# Patient Record
Sex: MALE
[De-identification: names, ages, dates, MRNs, and addresses within clinical notes are randomized per-mention and may not be internally consistent; named-entity substitution may affect disease eponyms.]

---

## 2023-02-02 ENCOUNTER — APPOINTMENT (OUTPATIENT)
Dept: ORTHOPEDIC SURGERY | Facility: CLINIC | Age: 63
End: 2023-02-02
Payer: COMMERCIAL

## 2023-02-02 PROBLEM — Z00.00 ENCOUNTER FOR PREVENTIVE HEALTH EXAMINATION: Status: ACTIVE | Noted: 2023-02-02

## 2023-02-02 PROCEDURE — 99203 OFFICE O/P NEW LOW 30 MIN: CPT

## 2023-02-02 NOTE — IMAGING
[de-identified] : CSPINE\par Inspection: No rash or ecchymosis\par Palpation: No spasm in traps, rhomboids, paracervicals\par ROM: limited all planes\par Strength: 5/5 bilateral deltoid, biceps, triceps, wrist flexors, wrist extensors, , abductors\par Sensation: Sensation present to light touch bilateral C5-T1 distributions\par Reflexes: Negative Newman's bilaterally, DTRs 1+ b/l biceps, diminished b/l BR\par Able to tandem gait

## 2023-02-02 NOTE — HISTORY OF PRESENT ILLNESS
[Neck] : neck [10] : 10 [Dull/Aching] : dull/aching [Intermittent] : intermittent [Sleep] : sleep [Nothing helps with pain getting better] : Nothing helps with pain getting better [de-identified] : C MRI report from 1/22/2018 reviewed- \par Moderate to severe central stenosis C4-C6, moderate central stenosis C6/7. B/L NF stenosis as well. 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 Has pain and N/T down the LUE. Has had CSIs previously, most recently 5 years ago. Reports history of stenosis. No bb dysfunction. No dexterity issues. No balance issues. \par Has been through formal PT. Takes roddy, has stopped working as well. \par Past surgical history: lumbar fusion 2022 (Dr. Vilchis, [place])  [] : no [FreeTextEntry5] : \par  [de-identified] : certain moment  [de-identified] : a few weeks ago  [de-identified] : ENT provider  [FreeTextEntry1] : F/u 1 month\par call in 1 week with progress [de-identified] : [place] (1 year ago)

## 2023-02-09 ENCOUNTER — FORM ENCOUNTER (OUTPATIENT)
Age: 63
End: 2023-02-09

## 2023-02-10 ENCOUNTER — APPOINTMENT (OUTPATIENT)
Dept: MRI IMAGING | Facility: CLINIC | Age: 63
End: 2023-02-10
Payer: COMMERCIAL

## 2023-02-10 PROCEDURE — 72141 MRI NECK SPINE W/O DYE: CPT

## 2023-03-30 ENCOUNTER — APPOINTMENT (OUTPATIENT)
Dept: ORTHOPEDIC SURGERY | Facility: CLINIC | Age: 63
End: 2023-03-30
Payer: COMMERCIAL

## 2023-03-30 PROCEDURE — 99215 OFFICE O/P EST HI 40 MIN: CPT

## 2023-03-30 NOTE — ASSESSMENT
[FreeTextEntry1] : C4/5 bulge w/ abutment of cord and L NF stenosis;\par C5/6, C6/7 bulges with NF stenosis\par Discussed surgery

## 2023-03-30 NOTE — IMAGING
[de-identified] : CSPINE\par Inspection: No rash or ecchymosis\par Palpation: No spasm in traps, rhomboids, paracervicals\par ROM: limited all planes\par Strength: 5/5 bilateral deltoid, biceps, triceps, wrist flexors, wrist extensors, , abductors\par Sensation: Sensation present to light touch bilateral C5-T1 distributions\par Reflexes: Negative Newman's bilaterally, DTRs 1+ b/l biceps, diminished b/l BR\par Able to tandem gait

## 2023-03-30 NOTE — HISTORY OF PRESENT ILLNESS
[Neck] : neck [10] : 10 [Dull/Aching] : dull/aching [Intermittent] : intermittent [Sleep] : sleep [Nothing helps with pain getting better] : Nothing helps with pain getting better [de-identified] : C MRI report from 1/22/2018 reviewed- \par Moderate to severe central stenosis C4-C6, moderate central stenosis C6/7. B/L NF stenosis as well. \par \par 2/10/23 Cervical MRI  - report noted in chart. \par Findings: Straightening of lordosis. Multilevel loss of disc signal and height. Multilevel anterior spurring and \par bulging. No compression fracture.\par C2-C3: No herniation, foraminal stenosis or central stenosis.\par C3-C4: Bulge with central herniation and no central stenosis. No foraminal stenosis.\par C4-C5: Grade 1 anterior spondylolisthesis. Bulge with central herniation and no central stenosis. Luschka \par hypertrophy and facet hypertrophy with left foraminal stenosis.\par C5-C6: Bulge with broad herniation and no central stenosis. Luschka hypertrophy and facet hypertrophy with \par foraminal stenosis.\par C6-C7: Bulge with broad herniation and no central stenosis. Luschka hypertrophy and facet hypertrophy with \par left foraminal stenosis.\par C7-T1: No herniation, foraminal stenosis or central stenosis.\par Cord, no atrophy or enlargement. Craniocervical junction is normally positioned. Facets are aligned and\par ligaments are intact. Soft tissues are intact. No muscle tear. No ligament tear. No fluid collection.\par Ind. review- \par C4/5 bulge w/ abutment of cord and L NF stenosis;\par C5/6, C6/7 bulges with NF stenosis\par -----------------------------------------------------\par 2/2/23- 64 y/o RDH M presents for neck pain X 4 years. Has pain and N/T down the LUE. Has had CSIs previously, most recently 5 years ago. Reports history of stenosis. No bb dysfunction. No dexterity issues. No balance issues. \par Has been through formal PT. Takes [employer], has stopped working as well. \par Past surgical history: lumbar fusion 2022 (Dr. Vilchis, [place]) \par 3/30/23- MRI f/u. Still pain, N/T radiating down the LUE to the digits [] : no [FreeTextEntry5] : \par  [de-identified] : certain moment  [de-identified] : a few weeks ago  [de-identified] : ENT provider  [de-identified] : [place] (1 year ago)

## 2023-05-25 ENCOUNTER — APPOINTMENT (OUTPATIENT)
Dept: ORTHOPEDIC SURGERY | Facility: CLINIC | Age: 63
End: 2023-05-25
Payer: COMMERCIAL

## 2023-05-25 PROCEDURE — 99214 OFFICE O/P EST MOD 30 MIN: CPT

## 2023-05-25 RX ORDER — CYCLOBENZAPRINE HYDROCHLORIDE 5 MG/1
5 TABLET, FILM COATED ORAL 3 TIMES DAILY
Qty: 90 | Refills: 0 | Status: ACTIVE | COMMUNITY
Start: 2023-05-25 | End: 1900-01-01

## 2023-05-25 NOTE — IMAGING
[de-identified] : CSPINE\par Inspection: No rash or ecchymosis\par Palpation: left trapezial tenderness/spasm\par ROM: limited all planes\par Strength: 5/5 bilateral deltoid, biceps, triceps, wrist flexors, wrist extensors, , abductors\par Sensation: Sensation present to light touch bilateral C5-T1 distributions\par Reflexes: Negative Newman's bilaterally, DTRs 1+ b/l biceps, diminished b/l BR\par Able to tandem gait

## 2023-05-25 NOTE — HISTORY OF PRESENT ILLNESS
[Neck] : neck [10] : 10 [Dull/Aching] : dull/aching [Intermittent] : intermittent [Sleep] : sleep [Nothing helps with pain getting better] : Nothing helps with pain getting better [de-identified] : C MRI report from 1/22/2018 reviewed- \par Moderate to severe central stenosis C4-C6, moderate central stenosis C6/7. B/L NF stenosis as well. \par \par 2/10/23 Cervical MRI  - report noted in chart. \par Findings: Straightening of lordosis. Multilevel loss of disc signal and height. Multilevel anterior spurring and \par bulging. No compression fracture.\par C2-C3: No herniation, foraminal stenosis or central stenosis.\par C3-C4: Bulge with central herniation and no central stenosis. No foraminal stenosis.\par C4-C5: Grade 1 anterior spondylolisthesis. Bulge with central herniation and no central stenosis. Luschka \par hypertrophy and facet hypertrophy with left foraminal stenosis.\par C5-C6: Bulge with broad herniation and no central stenosis. Luschka hypertrophy and facet hypertrophy with \par foraminal stenosis.\par C6-C7: Bulge with broad herniation and no central stenosis. Luschka hypertrophy and facet hypertrophy with \par left foraminal stenosis.\par C7-T1: No herniation, foraminal stenosis or central stenosis.\par Cord, no atrophy or enlargement. Craniocervical junction is normally positioned. Facets are aligned and\par ligaments are intact. Soft tissues are intact. No muscle tear. No ligament tear. No fluid collection.\par Ind. 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 Has pain and N/T down the LUE. Has had CSIs previously, most recently 5 years ago. Reports history of stenosis. No bb dysfunction. No dexterity issues. No balance issues. \par Has been through formal PT. Takes [employer], has stopped working as well. \par Past surgical history: lumbar fusion 2022 (Dr. Vilchis, [place]) \par 3/30/23- MRI f/u. Still pain, N/T radiating down the LUE to the digits\par 5/25/23- Pain worse since last visit. Has been doing HEP. Continued radiation down LUE to radial 2 digits. Cramping in R index finger as well.  Denies b/b incontinence. [] : no [FreeTextEntry5] : TAYLOR 63 year old M here for C-spine, Pt states pain has worsen since last visit. Pt is currently taking Tylenol and Advil with little to no relief. Reports he does at home exercises.  [de-identified] : certain moment  [de-identified] : a few weeks ago  [de-identified] : ENT provider  [de-identified] : [place] (1 year ago)

## 2023-05-25 NOTE — ASSESSMENT
[FreeTextEntry1] : C4/5 bulge w/ abutment of cord and L NF stenosis;\par C5/6, C6/7 bulges with NF stenosis\par discussed CSM and signs/symptoms to be watchful for\par Patient not interested in injections/surgery at this time. \par c/w HEP\par f/u 3 months\par \par Muscle Relaxants- To help decrease muscle spasm and assist with pain relief. Advised of sedating effects and instructed not to drive, operate heavy machinery, or take with other sedating medications.\par \par \par Patient seen by Alley Bautista PA-C,  under the supervision of  Dr. Rob Arellano M.D.\par

## 2023-08-24 ENCOUNTER — APPOINTMENT (OUTPATIENT)
Dept: ORTHOPEDIC SURGERY | Facility: CLINIC | Age: 63
End: 2023-08-24
Payer: COMMERCIAL

## 2023-08-24 PROCEDURE — 99213 OFFICE O/P EST LOW 20 MIN: CPT

## 2023-08-24 NOTE — ASSESSMENT
[FreeTextEntry1] : C4/5 bulge w/ abutment of cord and L NF stenosis; C5/6, C6/7 bulges with NF stenosis discussed CSM and signs/symptoms to be watchful for Patient not interested in injections/surgery at this time.  c/w HEP f/u 3 months  Muscle Relaxants- To help decrease muscle spasm and assist with pain relief. Advised of sedating effects and instructed not to drive, operate heavy machinery, or take with other sedating medications.   Patient seen by Alley Bautista PA-C,  under the supervision of  Dr. Rob Arellano M.D.

## 2023-08-24 NOTE — IMAGING
[de-identified] : CSPINE\par  Inspection: No rash or ecchymosis\par  Palpation: left trapezial tenderness/spasm\par  ROM: limited all planes\par  Strength: 5/5 bilateral deltoid, biceps, triceps, wrist flexors, wrist extensors, , abductors\par  Sensation: Sensation present to light touch bilateral C5-T1 distributions\par  Reflexes: Negative Newman's bilaterally, DTRs 1+ b/l biceps, diminished b/l BR\par  Able to tandem gait

## 2023-08-24 NOTE — HISTORY OF PRESENT ILLNESS
[Neck] : neck [10] : 10 [Dull/Aching] : dull/aching [Intermittent] : intermittent [Sleep] : sleep [Nothing helps with pain getting better] : Nothing helps with pain getting better [de-identified] : C MRI report from 1/22/2018 reviewed-  Moderate to severe central stenosis C4-C6, moderate central stenosis C6/7. B/L NF stenosis as well.   2/10/23 Cervical MRI  - report noted in chart.  Findings: Straightening of lordosis. Multilevel loss of disc signal and height. Multilevel anterior spurring and  bulging. No compression fracture. C2-C3: No herniation, foraminal stenosis or central stenosis. C3-C4: Bulge with central herniation and no central stenosis. No foraminal stenosis. C4-C5: Grade 1 anterior spondylolisthesis. Bulge with central herniation and no central stenosis. Luschka  hypertrophy and facet hypertrophy with left foraminal stenosis. C5-C6: Bulge with broad herniation and no central stenosis. Luschka hypertrophy and facet hypertrophy with  foraminal stenosis. C6-C7: Bulge with broad herniation and no central stenosis. Luschka hypertrophy and facet hypertrophy with  left foraminal stenosis. C7-T1: No herniation, foraminal stenosis or central stenosis. Cord, no atrophy or enlargement. Craniocervical junction is normally positioned. Facets are aligned and ligaments are intact. Soft tissues are intact. No muscle tear. No ligament tear. No fluid collection. Ind. 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 Has pain and N/T down the LUE. Has had CSIs previously, most recently 5 years ago. Reports history of stenosis. No bb dysfunction. No dexterity issues. No balance issues.  Has been through formal PT. Takes [employer], has stopped working as well.  Past surgical history: lumbar fusion 2022 (Dr. Vilchis, [place])  3/30/23- MRI f/u. Still pain, N/T radiating down the LUE to the digits 5/25/23- Pain worse since last visit. Has been doing HEP. Continued radiation down LUE to radial 2 digits. Cramping in R index finger as well.  Denies b/b incontinence. 8/24/23- neck pain the same. Continued radiation down LUE to radial 2 digits. Reports worsening balance/sensation of dizziness. Denies b/b dysfunction. Denies dexterity issues. Doing HEP.  [] : no [FreeTextEntry5] : TAYLOR 63 year old M here for C-spine follow up, pt states he hasn't improved since the last visit. He feels like his neck isn't strong enough to keep his head up and it hurts when he turns his head. Pt is currently taking Tylenol and Advil with little to no relief. Reports he does at home exercises.  [de-identified] : certain moment  [de-identified] : a few weeks ago  [de-identified] : ENT provider  [de-identified] : [place] (1 year ago)

## 2023-11-30 ENCOUNTER — APPOINTMENT (OUTPATIENT)
Dept: ORTHOPEDIC SURGERY | Facility: CLINIC | Age: 63
End: 2023-11-30
Payer: COMMERCIAL

## 2023-11-30 PROCEDURE — 99213 OFFICE O/P EST LOW 20 MIN: CPT

## 2023-12-05 ENCOUNTER — APPOINTMENT (OUTPATIENT)
Dept: INTERNAL MEDICINE | Facility: CLINIC | Age: 63
End: 2023-12-05

## 2024-02-08 ENCOUNTER — APPOINTMENT (OUTPATIENT)
Dept: ORTHOPEDIC SURGERY | Facility: CLINIC | Age: 64
End: 2024-02-08
Payer: COMMERCIAL

## 2024-02-08 PROCEDURE — 99213 OFFICE O/P EST LOW 20 MIN: CPT

## 2024-02-08 NOTE — ASSESSMENT
[FreeTextEntry1] : C4/5 bulge w/ abutment of cord and L NF stenosis; C5/6, C6/7 bulges with NF stenosis discussed CSM and signs/symptoms to be watchful for Patient not interested in injections/surgery at this time massage therapy referral f/u 3 months  Muscle Relaxants- To help decrease muscle spasm and assist with pain relief. Advised of sedating effects and instructed not to drive, operate heavy machinery, or take with other sedating medications.   Patient seen by Alley Bautista PA-C, under the supervision of Dr. Rob Arellano M.D.

## 2024-02-08 NOTE — IMAGING
[de-identified] : CSPINE Inspection: No rash or ecchymosis Palpation: left trapezial tenderness/spasm ROM: limited all planes Strength: 5/5 bilateral deltoid, biceps, triceps, wrist flexors, wrist extensors, , abductors Sensation: Sensation present to light touch bilateral C5-T1 distributions Reflexes: Negative Newman's bilaterally, DTRs 1+ b/l biceps, diminished b/l BR Able to tandem gait

## 2024-02-08 NOTE — HISTORY OF PRESENT ILLNESS
[Neck] : neck [10] : 10 [Dull/Aching] : dull/aching [Intermittent] : intermittent [Sleep] : sleep [Nothing helps with pain getting better] : Nothing helps with pain getting better [de-identified] : C MRI report from 1/22/2018 reviewed-  Moderate to severe central stenosis C4-C6, moderate central stenosis C6/7. B/L NF stenosis as well.   2/10/23 Cervical MRI  - report noted in chart.  Findings: Straightening of lordosis. Multilevel loss of disc signal and height. Multilevel anterior spurring and  bulging. No compression fracture. C2-C3: No herniation, foraminal stenosis or central stenosis. C3-C4: Bulge with central herniation and no central stenosis. No foraminal stenosis. C4-C5: Grade 1 anterior spondylolisthesis. Bulge with central herniation and no central stenosis. Luschka  hypertrophy and facet hypertrophy with left foraminal stenosis. C5-C6: Bulge with broad herniation and no central stenosis. Luschka hypertrophy and facet hypertrophy with  foraminal stenosis. C6-C7: Bulge with broad herniation and no central stenosis. Luschka hypertrophy and facet hypertrophy with  left foraminal stenosis. C7-T1: No herniation, foraminal stenosis or central stenosis. Cord, no atrophy or enlargement. Craniocervical junction is normally positioned. Facets are aligned and ligaments are intact. Soft tissues are intact. No muscle tear. No ligament tear. No fluid collection. Ind. 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 Has pain and N/T down the LUE. Has had CSIs previously, most recently 5 years ago. Reports history of stenosis. No bb dysfunction. No dexterity issues. No balance issues.  Has been through formal PT. Takes [employer], has stopped working as well.  Past surgical history: lumbar fusion 2022 (Dr. Vilchis, [place])  3/30/23- MRI f/u. Still pain, N/T radiating down the LUE to the digits 5/25/23- Pain worse since last visit. Has been doing HEP. Continued radiation down LUE to radial 2 digits. Cramping in R index finger as well.  Denies b/b incontinence. 8/24/23- neck pain the same. Continued radiation down LUE to radial 2 digits. Reports worsening balance/sensation of dizziness. Denies b/b dysfunction. Denies dexterity issues. Doing HEP.  11/30/23- Neck pain/stiffness worse since last visit. Numbness in LUE to ulnar 3 digits when waking up in AM. Was doing HEP, but discontinued due to aggravation of pain.  2/8/2024: Patient is here to follow up on cervical spine. Patient reports no improvement in neck since last visit and wasn't able to find massage therapy. Continued numbness in LUE to ulnar 3 digits. Associated headaches.  [] : no [FreeTextEntry5] : 02/08/2024: Pt is here today to follow up on neck pain  [de-identified] : certain moment  [de-identified] : a few weeks ago  [de-identified] : ENT provider  [de-identified] : [place] (1 year ago)

## 2024-05-09 ENCOUNTER — APPOINTMENT (OUTPATIENT)
Dept: ORTHOPEDIC SURGERY | Facility: CLINIC | Age: 64
End: 2024-05-09
Payer: COMMERCIAL

## 2024-05-09 DIAGNOSIS — M54.12 RADICULOPATHY, CERVICAL REGION: ICD-10-CM

## 2024-05-09 DIAGNOSIS — M75.02 ADHESIVE CAPSULITIS OF LEFT SHOULDER: ICD-10-CM

## 2024-05-09 DIAGNOSIS — M54.2 CERVICALGIA: ICD-10-CM

## 2024-05-09 DIAGNOSIS — M48.02 SPINAL STENOSIS, CERVICAL REGION: ICD-10-CM

## 2024-05-09 PROCEDURE — 99214 OFFICE O/P EST MOD 30 MIN: CPT

## 2024-05-09 NOTE — HISTORY OF PRESENT ILLNESS
[Neck] : neck [10] : 10 [Dull/Aching] : dull/aching [Intermittent] : intermittent [Sleep] : sleep [Nothing helps with pain getting better] : Nothing helps with pain getting better [de-identified] : C MRI report from 1/22/2018 reviewed-  Moderate to severe central stenosis C4-C6, moderate central stenosis C6/7. B/L NF stenosis as well.   2/10/23 Cervical MRI  - report noted in chart.  Findings: Straightening of lordosis. Multilevel loss of disc signal and height. Multilevel anterior spurring and  bulging. No compression fracture. C2-C3: No herniation, foraminal stenosis or central stenosis. C3-C4: Bulge with central herniation and no central stenosis. No foraminal stenosis. C4-C5: Grade 1 anterior spondylolisthesis. Bulge with central herniation and no central stenosis. Luschka  hypertrophy and facet hypertrophy with left foraminal stenosis. C5-C6: Bulge with broad herniation and no central stenosis. Luschka hypertrophy and facet hypertrophy with  foraminal stenosis. C6-C7: Bulge with broad herniation and no central stenosis. Luschka hypertrophy and facet hypertrophy with  left foraminal stenosis. C7-T1: No herniation, foraminal stenosis or central stenosis. Cord, no atrophy or enlargement. Craniocervical junction is normally positioned. Facets are aligned and ligaments are intact. Soft tissues are intact. No muscle tear. No ligament tear. No fluid collection. Ind. review-  C4/5 bulge w/ abutment of cord and L NF stenosis; C5/6, C6/7 bulges with NF stenosis ----------------------------------------------------- 2/2/23- 64 y/o RDH M presents for neck pain X 4 years. Has pain and N/T down the LUE. Has had CSIs previously, most recently 5 years ago. Reports history of stenosis. No bb dysfunction. No dexterity issues. No balance issues.  Has been through formal PT. Takes [employer], has stopped working as well.  Past surgical history: lumbar fusion 2022 (Dr. Vilchis, [place])  3/30/23- MRI f/u. Still pain, N/T radiating down the LUE to the digits 5/25/23- Pain worse since last visit. Has been doing HEP. Continued radiation down LUE to radial 2 digits. Cramping in R index finger as well.  Denies b/b incontinence. 8/24/23- neck pain the same. Continued radiation down LUE to radial 2 digits. Reports worsening balance/sensation of dizziness. Denies b/b dysfunction. Denies dexterity issues. Doing HEP.  11/30/23- Neck pain/stiffness worse since last visit. Numbness in LUE to ulnar 3 digits when waking up in AM. Was doing HEP, but discontinued due to aggravation of pain.  2/8/2024: Patient is here to follow up on cervical spine. Patient reports no improvement in neck since last visit and wasn't able to find massage therapy. Continued numbness in LUE to ulnar 3 digits. Associated headaches.  05/09/2024 TAYLOR 64 year M is here today to follow up on cervical spine pain. Patient states that there has been no improvement since last visit. pt states that he is now doing physical therapy and message therapy. which does not help with the pain. Still neck pain through the L scapula toward the LUE.   [] : no [FreeTextEntry5] : 02/08/2024: Pt is here today to follow up on neck pain  [de-identified] : certain moment  [de-identified] : a few weeks ago  [de-identified] : ENT provider  [de-identified] : [facility] (1 year ago)

## 2024-05-09 NOTE — IMAGING
[de-identified] : CSPINE Inspection: No rash or ecchymosis Palpation: left trapezial tenderness/spasm ROM: limited all planes Strength: 5/5 bilateral deltoid, biceps, triceps, wrist flexors, wrist extensors, , abductors Sensation: Sensation present to light touch bilateral C5-T1 distributions Reflexes: Negative Newman's bilaterally, DTRs 1+ b/l biceps, diminished b/l BR Able to tandem gait  Left shoulder ER in adduction 40 degrees with pain

## 2024-05-09 NOTE — ASSESSMENT
[FreeTextEntry1] : C4/5 bulge w/ abutment of cord and L NF stenosis; C5/6, C6/7 bulges with NF stenosis discussed CSM and signs/symptoms to be watchful for Patient not interested in injections/surgery at this time massage therapy referral f/u 3 months  PT  Muscle Relaxants- To help decrease muscle spasm and assist with pain relief. Advised of sedating effects and instructed not to drive, operate heavy machinery, or take with other sedating medications.

## 2024-09-12 ENCOUNTER — APPOINTMENT (OUTPATIENT)
Dept: ORTHOPEDIC SURGERY | Facility: CLINIC | Age: 64
End: 2024-09-12
Payer: COMMERCIAL

## 2024-09-12 DIAGNOSIS — M54.12 RADICULOPATHY, CERVICAL REGION: ICD-10-CM

## 2024-09-12 DIAGNOSIS — M54.2 CERVICALGIA: ICD-10-CM

## 2024-09-12 DIAGNOSIS — M48.02 SPINAL STENOSIS, CERVICAL REGION: ICD-10-CM

## 2024-09-12 PROCEDURE — 99213 OFFICE O/P EST LOW 20 MIN: CPT

## 2024-09-12 RX ORDER — MELOXICAM 15 MG/1
15 TABLET ORAL DAILY
Qty: 20 | Refills: 0 | Status: ACTIVE | COMMUNITY
Start: 2024-09-12 | End: 1900-01-01

## 2024-09-12 NOTE — HISTORY OF PRESENT ILLNESS
[Neck] : neck [10] : 10 [Dull/Aching] : dull/aching [Intermittent] : intermittent [Sleep] : sleep [Nothing helps with pain getting better] : Nothing helps with pain getting better [de-identified] : C MRI report from 1/22/2018 reviewed-  Moderate to severe central stenosis C4-C6, moderate central stenosis C6/7. B/L NF stenosis as well.   2/10/23 Cervical MRI  - report noted in chart.  Findings: Straightening of lordosis. Multilevel loss of disc signal and height. Multilevel anterior spurring and  bulging. No compression fracture. C2-C3: No herniation, foraminal stenosis or central stenosis. C3-C4: Bulge with central herniation and no central stenosis. No foraminal stenosis. C4-C5: Grade 1 anterior spondylolisthesis. Bulge with central herniation and no central stenosis. Luschka  hypertrophy and facet hypertrophy with left foraminal stenosis. C5-C6: Bulge with broad herniation and no central stenosis. Luschka hypertrophy and facet hypertrophy with  foraminal stenosis. C6-C7: Bulge with broad herniation and no central stenosis. Luschka hypertrophy and facet hypertrophy with  left foraminal stenosis. C7-T1: No herniation, foraminal stenosis or central stenosis. Cord, no atrophy or enlargement. Craniocervical junction is normally positioned. Facets are aligned and ligaments are intact. Soft tissues are intact. No muscle tear. No ligament tear. No fluid collection. Ind. review-  C4/5 bulge w/ abutment of cord and L NF stenosis; C5/6, C6/7 bulges with NF stenosis ----------------------------------------------------- 2/2/23- 64 y/o RDH M presents for neck pain X 4 years. Has pain and N/T down the LUE. Has had CSIs previously, most recently 5 years ago. Reports history of stenosis. No bb dysfunction. No dexterity issues. No balance issues.  Has been through formal PT. Takes [employer], has stopped working as well.  Past surgical history: lumbar fusion 2022 (Dr. Vilchis, [place])  3/30/23- MRI f/u. Still pain, N/T radiating down the LUE to the digits 5/25/23- Pain worse since last visit. Has been doing HEP. Continued radiation down LUE to radial 2 digits. Cramping in R index finger as well.  Denies b/b incontinence. 8/24/23- neck pain the same. Continued radiation down LUE to radial 2 digits. Reports worsening balance/sensation of dizziness. Denies b/b dysfunction. Denies dexterity issues. Doing HEP.  11/30/23- Neck pain/stiffness worse since last visit. Numbness in LUE to ulnar 3 digits when waking up in AM. Was doing HEP, but discontinued due to aggravation of pain.  2/8/2024: Patient is here to follow up on cervical spine. Patient reports no improvement in neck since last visit and wasn't able to find massage therapy. Continued numbness in LUE to ulnar 3 digits. Associated headaches.  05/09/2024 TAYLOR 64 year M is here today to follow up on cervical spine pain. Patient states that there has been no improvement since last visit. pt states that he is now doing physical therapy and message therapy. which does not help with the pain. Still neck pain through the L scapula toward the LUE.    09/12/2024: Patient is here to follow up cervical spine and left scapula. Patient finished PT, does not take meds. Patient reports symptoms have worsened since last visit, reports intense pain traveling down LUE and across the left side of his face. [] : no [FreeTextEntry5] : 02/08/2024: Pt is here today to follow up on neck pain  [de-identified] : certain moment  [de-identified] : a few weeks ago  [de-identified] : ENT provider  [de-identified] : [facility] (1 year ago)

## 2024-09-12 NOTE — IMAGING
[de-identified] : CSPINE Inspection: No rash or ecchymosis Palpation: left trapezial tenderness/spasm ROM: limited all planes Strength: 5/5 bilateral deltoid, biceps, triceps, wrist flexors, wrist extensors, , abductors Sensation: Sensation present to light touch bilateral C5-T1 distributions Reflexes: Negative Newman's bilaterally, DTRs 1+ b/l biceps, diminished b/l BR Able to tandem gait  Left shoulder ER in adduction 40 degrees with pain

## 2024-09-12 NOTE — ASSESSMENT
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  Advised to clear medication with internist or PCP if any concurrent health problem with heart, blood pressure, or GI system exists.  Patient seen by KURTIS Rodriguez under the direct supervision of Rob Day

## 2024-09-30 ENCOUNTER — APPOINTMENT (OUTPATIENT)
Dept: PAIN MANAGEMENT | Facility: CLINIC | Age: 64
End: 2024-09-30
Payer: COMMERCIAL

## 2024-09-30 VITALS — BODY MASS INDEX: 28.88 KG/M2 | HEIGHT: 67 IN | WEIGHT: 184 LBS

## 2024-09-30 DIAGNOSIS — M54.12 RADICULOPATHY, CERVICAL REGION: ICD-10-CM

## 2024-09-30 DIAGNOSIS — Z86.69 PERSONAL HISTORY OF OTHER DISEASES OF THE NERVOUS SYSTEM AND SENSE ORGANS: ICD-10-CM

## 2024-09-30 DIAGNOSIS — Z82.49 FAMILY HISTORY OF ISCHEMIC HEART DISEASE AND OTHER DISEASES OF THE CIRCULATORY SYSTEM: ICD-10-CM

## 2024-09-30 DIAGNOSIS — M48.02 SPINAL STENOSIS, CERVICAL REGION: ICD-10-CM

## 2024-09-30 DIAGNOSIS — Z86.79 PERSONAL HISTORY OF OTHER DISEASES OF THE CIRCULATORY SYSTEM: ICD-10-CM

## 2024-09-30 DIAGNOSIS — Z86.39 PERSONAL HISTORY OF OTHER ENDOCRINE, NUTRITIONAL AND METABOLIC DISEASE: ICD-10-CM

## 2024-09-30 DIAGNOSIS — M54.2 CERVICALGIA: ICD-10-CM

## 2024-09-30 DIAGNOSIS — M47.812 SPONDYLOSIS W/OUT MYELOPATHY OR RADICULOPATHY, CERVICAL REGION: ICD-10-CM

## 2024-09-30 PROCEDURE — 99204 OFFICE O/P NEW MOD 45 MIN: CPT

## 2024-09-30 NOTE — PHYSICAL EXAM
[de-identified] : Gen: NAD Head: NC/AT Neck: +spurling's on the LEFT, +TTP in the left mid cervical facet region Eyes: no glasses, no scleral icterus ENT: mucous membranes moist CV: no JVD Lungs: nonlabored breathing Abd: soft, NT/ND Ext: full ROM in all extremities, no peripheral edema Neuro: CN intact UEs +5 L +5 R shoulder abduction +5 L +5 R arm abduction +5 L +5 R forearm flexion +5 L +5 R forearm extension +5 L +5 R finger flexion +5 L +5 R  strength Psych: normal affect Skin: no visible lesions

## 2024-09-30 NOTE — DATA REVIEWED
[MRI] : MRI [Cervical Spine] : cervical spine [Report was reviewed and noted in the chart] : The report was reviewed and noted in the chart [I independently reviewed and interpreted images and report] : I independently reviewed and interpreted images and report [FreeTextEntry1] : 2/10/2023 MRI Cervical Spine O&C C4-C5: grade I anterolisthesis, disc bulge w/ central herniation; luschka hypertrophy, facet hypertrophy, LEFT NF stenosis C5-C6: disc bulge w/ broad herniation w/ no central stenosis, luschka hypertrophy, facet hypertrophy w/ NF stenosis C6-C7: disc bulge w/ broad herniation w/ no central stenosis, luschka hypertrophy, facet hypertrophy w/ LEFT NF stenosis

## 2024-09-30 NOTE — DISCUSSION/SUMMARY
[de-identified] : TAYLOR GASPAR is a 64 year-old man presenting for a NPV for a history of chronic neck pain.  Prior treatment: NURY w/ significant improvement of pain Physical therapy TENS unit Acupuncture Heat, rest, ice Patient has participated and failed at least 6 weeks of conservative therapy including physical therapy and a prescribed home exercise program as well as 6 weeks of activity modifications, including heat, ice, rest, and over the counter medications.  Plan: 1) MRI cervical spine images reviewed with the patient. 2) Discussed a C6-C7 NURY, patient declined 3) Schedule LEFT C3, C4, C5, C6 MBB w/o MAC. The procedure was explained to the patient in detail. Reviewed risks, benefits, and alternatives with the patient. Some risks discussed included temporary increase in pain, bleeding, infection, and side effects from steroids. The patient expressed understanding and would like to proceed. 4) Continue home exercise regimen 5) RTC post procedure

## 2024-09-30 NOTE — PHYSICAL EXAM
[de-identified] : Gen: NAD Head: NC/AT Neck: +spurling's on the LEFT, +TTP in the left mid cervical facet region Eyes: no glasses, no scleral icterus ENT: mucous membranes moist CV: no JVD Lungs: nonlabored breathing Abd: soft, NT/ND Ext: full ROM in all extremities, no peripheral edema Neuro: CN intact UEs +5 L +5 R shoulder abduction +5 L +5 R arm abduction +5 L +5 R forearm flexion +5 L +5 R forearm extension +5 L +5 R finger flexion +5 L +5 R  strength Psych: normal affect Skin: no visible lesions

## 2024-09-30 NOTE — DISCUSSION/SUMMARY
[de-identified] : TAYLOR GASPAR is a 64 year-old man presenting for a NPV for a history of chronic neck pain.  Prior treatment: NURY w/ significant improvement of pain Physical therapy TENS unit Acupuncture Heat, rest, ice Patient has participated and failed at least 6 weeks of conservative therapy including physical therapy and a prescribed home exercise program as well as 6 weeks of activity modifications, including heat, ice, rest, and over the counter medications.  Plan: 1) MRI cervical spine images reviewed with the patient. 2) Discussed a C6-C7 NURY, patient declined 3) Schedule LEFT C3, C4, C5, C6 MBB w/o MAC. The procedure was explained to the patient in detail. Reviewed risks, benefits, and alternatives with the patient. Some risks discussed included temporary increase in pain, bleeding, infection, and side effects from steroids. The patient expressed understanding and would like to proceed. 4) Continue home exercise regimen 5) RTC post procedure

## 2024-09-30 NOTE — HISTORY OF PRESENT ILLNESS
[Neck] : neck [Left Arm] : left arm [8] : 8 [10] : 10 [Dull/Aching] : dull/aching [Radiating] : radiating [Tightness] : tightness [Tingling] : tingling [Intermittent] : intermittent [Household chores] : household chores [Leisure] : leisure [Sleep] : sleep [Physical therapy] : physical therapy [Lying in bed] : lying in bed [] : no [FreeTextEntry1] : L shoulder  [FreeTextEntry6] : numbness and tingling in L arm  [FreeTextEntry7] : L arm  [de-identified] : Meloxicam and cyclobenzaprine did not help with pain given by Dr. Arellano

## 2024-09-30 NOTE — HISTORY OF PRESENT ILLNESS
[Neck] : neck [Left Arm] : left arm [8] : 8 [10] : 10 [Dull/Aching] : dull/aching [Radiating] : radiating [Tightness] : tightness [Tingling] : tingling [Intermittent] : intermittent [Household chores] : household chores [Leisure] : leisure [Sleep] : sleep [Physical therapy] : physical therapy [Lying in bed] : lying in bed [] : no [FreeTextEntry1] : L shoulder  [FreeTextEntry6] : numbness and tingling in L arm  [FreeTextEntry7] : L arm  [de-identified] : Meloxicam and cyclobenzaprine did not help with pain given by Dr. Arellano

## 2024-10-22 ENCOUNTER — APPOINTMENT (OUTPATIENT)
Dept: PAIN MANAGEMENT | Facility: CLINIC | Age: 64
End: 2024-10-22
Payer: COMMERCIAL

## 2024-10-22 DIAGNOSIS — M47.812 SPONDYLOSIS W/OUT MYELOPATHY OR RADICULOPATHY, CERVICAL REGION: ICD-10-CM

## 2024-10-22 PROCEDURE — 64492 INJ PARAVERT F JNT C/T 3 LEV: CPT | Mod: LT

## 2024-10-22 PROCEDURE — 64491 INJ PARAVERT F JNT C/T 2 LEV: CPT | Mod: LT

## 2024-10-22 PROCEDURE — 64490 INJ PARAVERT F JNT C/T 1 LEV: CPT | Mod: LT

## 2024-11-04 ENCOUNTER — APPOINTMENT (OUTPATIENT)
Dept: PAIN MANAGEMENT | Facility: CLINIC | Age: 64
End: 2024-11-04
Payer: COMMERCIAL

## 2024-11-04 VITALS — BODY MASS INDEX: 29.03 KG/M2 | WEIGHT: 185 LBS | HEIGHT: 67 IN

## 2024-11-04 DIAGNOSIS — M47.812 SPONDYLOSIS W/OUT MYELOPATHY OR RADICULOPATHY, CERVICAL REGION: ICD-10-CM

## 2024-11-04 DIAGNOSIS — M75.02 ADHESIVE CAPSULITIS OF LEFT SHOULDER: ICD-10-CM

## 2024-11-04 PROCEDURE — 99214 OFFICE O/P EST MOD 30 MIN: CPT

## 2024-12-24 ENCOUNTER — APPOINTMENT (OUTPATIENT)
Dept: PAIN MANAGEMENT | Facility: CLINIC | Age: 64
End: 2024-12-24
Payer: COMMERCIAL

## 2024-12-24 DIAGNOSIS — M54.2 CERVICALGIA: ICD-10-CM

## 2024-12-24 DIAGNOSIS — M47.812 SPONDYLOSIS W/OUT MYELOPATHY OR RADICULOPATHY, CERVICAL REGION: ICD-10-CM

## 2024-12-24 PROCEDURE — 64491 INJ PARAVERT F JNT C/T 2 LEV: CPT | Mod: 59,LT

## 2024-12-24 PROCEDURE — 64490 INJ PARAVERT F JNT C/T 1 LEV: CPT | Mod: LT

## 2024-12-24 PROCEDURE — 64492 INJ PARAVERT F JNT C/T 3 LEV: CPT | Mod: 59,LT

## 2025-01-07 ENCOUNTER — APPOINTMENT (OUTPATIENT)
Dept: PAIN MANAGEMENT | Facility: CLINIC | Age: 65
End: 2025-01-07
Payer: COMMERCIAL

## 2025-01-07 VITALS — BODY MASS INDEX: 28.72 KG/M2 | WEIGHT: 183 LBS | HEIGHT: 67 IN

## 2025-01-07 DIAGNOSIS — M47.812 SPONDYLOSIS W/OUT MYELOPATHY OR RADICULOPATHY, CERVICAL REGION: ICD-10-CM

## 2025-01-07 DIAGNOSIS — M54.2 CERVICALGIA: ICD-10-CM

## 2025-01-07 DIAGNOSIS — M48.02 SPINAL STENOSIS, CERVICAL REGION: ICD-10-CM

## 2025-01-07 PROCEDURE — 99214 OFFICE O/P EST MOD 30 MIN: CPT

## 2025-01-23 ENCOUNTER — APPOINTMENT (OUTPATIENT)
Dept: PAIN MANAGEMENT | Facility: CLINIC | Age: 65
End: 2025-01-23
Payer: COMMERCIAL

## 2025-01-23 DIAGNOSIS — M47.812 SPONDYLOSIS W/OUT MYELOPATHY OR RADICULOPATHY, CERVICAL REGION: ICD-10-CM

## 2025-01-23 PROCEDURE — 64633 DESTROY CERV/THOR FACET JNT: CPT | Mod: LT

## 2025-01-23 PROCEDURE — 64634Z: CUSTOM | Mod: 59,LT

## 2025-02-10 ENCOUNTER — APPOINTMENT (OUTPATIENT)
Dept: PAIN MANAGEMENT | Facility: CLINIC | Age: 65
End: 2025-02-10
Payer: COMMERCIAL

## 2025-02-10 VITALS — WEIGHT: 183 LBS | HEIGHT: 67 IN | BODY MASS INDEX: 28.72 KG/M2

## 2025-02-10 DIAGNOSIS — M47.812 SPONDYLOSIS W/OUT MYELOPATHY OR RADICULOPATHY, CERVICAL REGION: ICD-10-CM

## 2025-02-10 DIAGNOSIS — M51.16 INTERVERTEBRAL DISC DISORDERS WITH RADICULOPATHY, LUMBAR REGION: ICD-10-CM

## 2025-02-10 DIAGNOSIS — M54.2 CERVICALGIA: ICD-10-CM

## 2025-02-10 DIAGNOSIS — M48.02 SPINAL STENOSIS, CERVICAL REGION: ICD-10-CM

## 2025-02-10 DIAGNOSIS — M96.1 POSTLAMINECTOMY SYNDROME, NOT ELSEWHERE CLASSIFIED: ICD-10-CM

## 2025-02-10 PROCEDURE — 99214 OFFICE O/P EST MOD 30 MIN: CPT

## 2025-02-19 ENCOUNTER — APPOINTMENT (OUTPATIENT)
Dept: MRI IMAGING | Facility: CLINIC | Age: 65
End: 2025-02-19
Payer: COMMERCIAL

## 2025-02-19 PROCEDURE — 72148 MRI LUMBAR SPINE W/O DYE: CPT

## 2025-03-06 ENCOUNTER — APPOINTMENT (OUTPATIENT)
Dept: PAIN MANAGEMENT | Facility: CLINIC | Age: 65
End: 2025-03-06
Payer: COMMERCIAL

## 2025-03-06 VITALS — BODY MASS INDEX: 28.72 KG/M2 | WEIGHT: 183 LBS | HEIGHT: 67 IN

## 2025-03-06 DIAGNOSIS — M54.12 RADICULOPATHY, CERVICAL REGION: ICD-10-CM

## 2025-03-06 DIAGNOSIS — M96.1 POSTLAMINECTOMY SYNDROME, NOT ELSEWHERE CLASSIFIED: ICD-10-CM

## 2025-03-06 DIAGNOSIS — M48.02 SPINAL STENOSIS, CERVICAL REGION: ICD-10-CM

## 2025-03-06 DIAGNOSIS — M47.812 SPONDYLOSIS W/OUT MYELOPATHY OR RADICULOPATHY, CERVICAL REGION: ICD-10-CM

## 2025-03-06 DIAGNOSIS — M51.16 INTERVERTEBRAL DISC DISORDERS WITH RADICULOPATHY, LUMBAR REGION: ICD-10-CM

## 2025-03-06 PROCEDURE — 99214 OFFICE O/P EST MOD 30 MIN: CPT

## 2025-03-06 RX ORDER — GABAPENTIN 300 MG/1
300 CAPSULE ORAL
Qty: 60 | Refills: 1 | Status: ACTIVE | COMMUNITY
Start: 2025-03-06 | End: 1900-01-01

## 2025-04-17 ENCOUNTER — APPOINTMENT (OUTPATIENT)
Dept: PAIN MANAGEMENT | Facility: CLINIC | Age: 65
End: 2025-04-17
Payer: COMMERCIAL

## 2025-04-17 VITALS — BODY MASS INDEX: 28.72 KG/M2 | WEIGHT: 183 LBS | HEIGHT: 67 IN

## 2025-04-17 DIAGNOSIS — M96.1 POSTLAMINECTOMY SYNDROME, NOT ELSEWHERE CLASSIFIED: ICD-10-CM

## 2025-04-17 DIAGNOSIS — M51.16 INTERVERTEBRAL DISC DISORDERS WITH RADICULOPATHY, LUMBAR REGION: ICD-10-CM

## 2025-04-17 DIAGNOSIS — M54.12 RADICULOPATHY, CERVICAL REGION: ICD-10-CM

## 2025-04-17 DIAGNOSIS — M47.812 SPONDYLOSIS W/OUT MYELOPATHY OR RADICULOPATHY, CERVICAL REGION: ICD-10-CM

## 2025-04-17 PROCEDURE — 99214 OFFICE O/P EST MOD 30 MIN: CPT

## 2025-04-17 RX ORDER — CYCLOBENZAPRINE HYDROCHLORIDE 5 MG/1
5 TABLET, FILM COATED ORAL TWICE DAILY
Qty: 60 | Refills: 1 | Status: ACTIVE | COMMUNITY
Start: 2025-04-17 | End: 1900-01-01

## 2025-06-12 ENCOUNTER — APPOINTMENT (OUTPATIENT)
Dept: PAIN MANAGEMENT | Facility: CLINIC | Age: 65
End: 2025-06-12
Payer: COMMERCIAL

## 2025-06-12 VITALS — WEIGHT: 182 LBS | BODY MASS INDEX: 28.56 KG/M2 | HEIGHT: 67 IN

## 2025-06-12 PROCEDURE — 99214 OFFICE O/P EST MOD 30 MIN: CPT

## 2025-09-13 ENCOUNTER — NON-APPOINTMENT (OUTPATIENT)
Age: 65
End: 2025-09-13

## 2025-09-15 ENCOUNTER — APPOINTMENT (OUTPATIENT)
Dept: PAIN MANAGEMENT | Facility: CLINIC | Age: 65
End: 2025-09-15
Payer: COMMERCIAL

## 2025-09-15 VITALS — BODY MASS INDEX: 28.72 KG/M2 | HEIGHT: 67 IN | WEIGHT: 183 LBS

## 2025-09-15 DIAGNOSIS — M51.16 INTERVERTEBRAL DISC DISORDERS WITH RADICULOPATHY, LUMBAR REGION: ICD-10-CM

## 2025-09-15 DIAGNOSIS — M48.02 SPINAL STENOSIS, CERVICAL REGION: ICD-10-CM

## 2025-09-15 DIAGNOSIS — M47.812 SPONDYLOSIS W/OUT MYELOPATHY OR RADICULOPATHY, CERVICAL REGION: ICD-10-CM

## 2025-09-15 DIAGNOSIS — Z86.79 PERSONAL HISTORY OF OTHER DISEASES OF THE CIRCULATORY SYSTEM: ICD-10-CM

## 2025-09-15 DIAGNOSIS — M96.1 POSTLAMINECTOMY SYNDROME, NOT ELSEWHERE CLASSIFIED: ICD-10-CM

## 2025-09-15 PROCEDURE — 99214 OFFICE O/P EST MOD 30 MIN: CPT

## 2025-09-15 RX ORDER — DICLOFENAC SODIUM 75 MG/1
75 TABLET, DELAYED RELEASE ORAL
Qty: 60 | Refills: 1 | Status: ACTIVE | COMMUNITY
Start: 2025-09-15 | End: 1900-01-01